# Patient Record
Sex: FEMALE | Race: WHITE
[De-identification: names, ages, dates, MRNs, and addresses within clinical notes are randomized per-mention and may not be internally consistent; named-entity substitution may affect disease eponyms.]

---

## 2017-04-24 ENCOUNTER — HOSPITAL ENCOUNTER (OUTPATIENT)
Dept: HOSPITAL 8 - CFH | Age: 67
Discharge: HOME | End: 2017-04-24
Attending: NURSE PRACTITIONER
Payer: MEDICARE

## 2017-04-24 DIAGNOSIS — M79.602: Primary | ICD-10-CM

## 2020-07-03 ENCOUNTER — HOSPITAL ENCOUNTER (EMERGENCY)
Dept: HOSPITAL 8 - ED | Age: 70
Discharge: HOME | End: 2020-07-03
Payer: MEDICARE

## 2020-07-03 VITALS — WEIGHT: 150.8 LBS | HEIGHT: 64 IN | BODY MASS INDEX: 25.74 KG/M2

## 2020-07-03 VITALS — SYSTOLIC BLOOD PRESSURE: 117 MMHG | DIASTOLIC BLOOD PRESSURE: 51 MMHG

## 2020-07-03 DIAGNOSIS — E78.00: ICD-10-CM

## 2020-07-03 DIAGNOSIS — Z87.891: ICD-10-CM

## 2020-07-03 DIAGNOSIS — I10: ICD-10-CM

## 2020-07-03 DIAGNOSIS — R51: ICD-10-CM

## 2020-07-03 DIAGNOSIS — E86.0: ICD-10-CM

## 2020-07-03 DIAGNOSIS — R42: Primary | ICD-10-CM

## 2020-07-03 LAB
ALBUMIN SERPL-MCNC: 4.3 G/DL (ref 3.4–5)
ALP SERPL-CCNC: 75 U/L (ref 45–117)
ALT SERPL-CCNC: 40 U/L (ref 12–78)
ANION GAP SERPL CALC-SCNC: 8 MMOL/L (ref 5–15)
BASOPHILS # BLD AUTO: 0.05 X10^3/UL (ref 0–0.1)
BASOPHILS NFR BLD AUTO: 1 % (ref 0–1)
BILIRUB SERPL-MCNC: 0.4 MG/DL (ref 0.2–1)
CALCIUM SERPL-MCNC: 9.6 MG/DL (ref 8.5–10.1)
CHLORIDE SERPL-SCNC: 108 MMOL/L (ref 98–107)
CREAT SERPL-MCNC: 0.81 MG/DL (ref 0.55–1.02)
EOSINOPHIL # BLD AUTO: 0.14 X10^3/UL (ref 0–0.4)
EOSINOPHIL NFR BLD AUTO: 2 % (ref 1–7)
ERYTHROCYTE [DISTWIDTH] IN BLOOD BY AUTOMATED COUNT: 13.5 % (ref 9.6–15.2)
LYMPHOCYTES # BLD AUTO: 2.06 X10^3/UL (ref 1–3.4)
LYMPHOCYTES NFR BLD AUTO: 27 % (ref 22–44)
MCH RBC QN AUTO: 31.2 PG (ref 27–34.8)
MCHC RBC AUTO-ENTMCNC: 33.2 G/DL (ref 32.4–35.8)
MCV RBC AUTO: 94 FL (ref 80–100)
MD: NO
MICROSCOPIC: (no result)
MONOCYTES # BLD AUTO: 0.43 X10^3/UL (ref 0.2–0.8)
MONOCYTES NFR BLD AUTO: 6 % (ref 2–9)
NEUTROPHILS # BLD AUTO: 4.83 X10^3/UL (ref 1.8–6.8)
NEUTROPHILS NFR BLD AUTO: 64 % (ref 42–75)
PLATELET # BLD AUTO: 245 X10^3/UL (ref 130–400)
PMV BLD AUTO: 8.4 FL (ref 7.4–10.4)
PROT SERPL-MCNC: 7.7 G/DL (ref 6.4–8.2)
RBC # BLD AUTO: 4.69 X10^6/UL (ref 3.82–5.3)

## 2020-07-03 PROCEDURE — 83735 ASSAY OF MAGNESIUM: CPT

## 2020-07-03 PROCEDURE — 82607 VITAMIN B-12: CPT

## 2020-07-03 PROCEDURE — 81003 URINALYSIS AUTO W/O SCOPE: CPT

## 2020-07-03 PROCEDURE — 80053 COMPREHEN METABOLIC PANEL: CPT

## 2020-07-03 PROCEDURE — 85025 COMPLETE CBC W/AUTO DIFF WBC: CPT

## 2020-07-03 PROCEDURE — 96360 HYDRATION IV INFUSION INIT: CPT

## 2020-07-03 PROCEDURE — 93005 ELECTROCARDIOGRAM TRACING: CPT

## 2020-07-03 PROCEDURE — 70450 CT HEAD/BRAIN W/O DYE: CPT

## 2020-07-03 PROCEDURE — 36415 COLL VENOUS BLD VENIPUNCTURE: CPT

## 2020-07-03 PROCEDURE — 99285 EMERGENCY DEPT VISIT HI MDM: CPT

## 2020-07-03 NOTE — NUR
Pt c/o vertigo sx x2 days as well as LLE weakness and numbness. Pt ambulatory 
with steady gait, denies pain, no other neuro defecits. Pt placed in gown and 
positioned for comfort in bed with warm blanket. Coninutous heart, oxygen and 
BP monitors appleid, all safety measures observed.

## 2020-07-03 NOTE — NUR
Pt resting in bed looking at her phone, FILOMENA. Pt denies dizziness, pain or 
other needs at this time.

## 2021-01-15 DIAGNOSIS — Z23 NEED FOR VACCINATION: ICD-10-CM

## 2021-01-26 ENCOUNTER — APPOINTMENT (OUTPATIENT)
Dept: FAMILY PLANNING/WOMEN'S HEALTH CLINIC | Facility: IMMUNIZATION CENTER | Age: 71
End: 2021-01-26
Attending: INTERNAL MEDICINE
Payer: MEDICARE

## 2021-01-26 ENCOUNTER — IMMUNIZATION (OUTPATIENT)
Dept: FAMILY PLANNING/WOMEN'S HEALTH CLINIC | Facility: IMMUNIZATION CENTER | Age: 71
End: 2021-01-26
Payer: MEDICARE

## 2021-01-26 DIAGNOSIS — Z23 NEED FOR VACCINATION: ICD-10-CM

## 2021-01-26 DIAGNOSIS — Z23 ENCOUNTER FOR VACCINATION: Primary | ICD-10-CM

## 2021-01-26 PROCEDURE — 0001A PFIZER SARS-COV-2 VACCINE: CPT | Performed by: PHYSICIAN ASSISTANT

## 2021-01-26 PROCEDURE — 91300 PFIZER SARS-COV-2 VACCINE: CPT | Performed by: PHYSICIAN ASSISTANT

## 2021-02-18 ENCOUNTER — IMMUNIZATION (OUTPATIENT)
Dept: FAMILY PLANNING/WOMEN'S HEALTH CLINIC | Facility: IMMUNIZATION CENTER | Age: 71
End: 2021-02-18
Attending: INTERNAL MEDICINE
Payer: MEDICARE

## 2021-02-18 DIAGNOSIS — Z23 ENCOUNTER FOR VACCINATION: Primary | ICD-10-CM

## 2021-02-18 PROCEDURE — 91300 PFIZER SARS-COV-2 VACCINE: CPT

## 2021-02-18 PROCEDURE — 0002A PFIZER SARS-COV-2 VACCINE: CPT

## 2023-01-01 ENCOUNTER — PATIENT MESSAGE (OUTPATIENT)
Dept: HEALTH INFORMATION MANAGEMENT | Facility: OTHER | Age: 73
End: 2023-01-01

## 2023-01-01 ENCOUNTER — HOSPITAL ENCOUNTER (OUTPATIENT)
Dept: CARDIOLOGY | Facility: MEDICAL CENTER | Age: 73
End: 2023-07-27
Attending: INTERNAL MEDICINE
Payer: MEDICARE

## 2023-01-01 ENCOUNTER — HOSPITAL ENCOUNTER (OUTPATIENT)
Dept: LAB | Facility: MEDICAL CENTER | Age: 73
End: 2023-06-26
Attending: FAMILY MEDICINE
Payer: MEDICARE

## 2023-01-01 ENCOUNTER — OFFICE VISIT (OUTPATIENT)
Dept: CARDIOLOGY | Facility: MEDICAL CENTER | Age: 73
End: 2023-01-01
Attending: INTERNAL MEDICINE
Payer: MEDICARE

## 2023-01-01 ENCOUNTER — HOSPITAL ENCOUNTER (OUTPATIENT)
Dept: RADIOLOGY | Facility: MEDICAL CENTER | Age: 73
End: 2023-08-31
Attending: INTERNAL MEDICINE
Payer: MEDICARE

## 2023-01-01 ENCOUNTER — HOSPITAL ENCOUNTER (OUTPATIENT)
Dept: RADIOLOGY | Facility: MEDICAL CENTER | Age: 73
End: 2023-12-13
Attending: EMERGENCY MEDICINE
Payer: MEDICARE

## 2023-01-01 ENCOUNTER — TELEPHONE (OUTPATIENT)
Dept: CARDIOLOGY | Facility: MEDICAL CENTER | Age: 73
End: 2023-01-01
Payer: MEDICARE

## 2023-01-01 ENCOUNTER — APPOINTMENT (OUTPATIENT)
Dept: RADIOLOGY | Facility: MEDICAL CENTER | Age: 73
End: 2023-01-01
Attending: INTERNAL MEDICINE
Payer: MEDICARE

## 2023-01-01 ENCOUNTER — HOSPITAL ENCOUNTER (OUTPATIENT)
Dept: RADIOLOGY | Facility: MEDICAL CENTER | Age: 73
End: 2023-09-07
Attending: INTERNAL MEDICINE
Payer: MEDICARE

## 2023-01-01 ENCOUNTER — OFFICE VISIT (OUTPATIENT)
Dept: URGENT CARE | Facility: CLINIC | Age: 73
End: 2023-01-01
Payer: MEDICARE

## 2023-01-01 ENCOUNTER — HOSPITAL ENCOUNTER (OUTPATIENT)
Dept: RADIOLOGY | Facility: MEDICAL CENTER | Age: 73
End: 2023-12-13
Payer: MEDICARE

## 2023-01-01 VITALS
RESPIRATION RATE: 16 BRPM | HEIGHT: 64 IN | SYSTOLIC BLOOD PRESSURE: 116 MMHG | DIASTOLIC BLOOD PRESSURE: 62 MMHG | BODY MASS INDEX: 31.07 KG/M2 | WEIGHT: 182 LBS | OXYGEN SATURATION: 95 % | HEART RATE: 66 BPM

## 2023-01-01 VITALS
HEART RATE: 76 BPM | OXYGEN SATURATION: 96 % | RESPIRATION RATE: 20 BRPM | TEMPERATURE: 98.2 F | SYSTOLIC BLOOD PRESSURE: 114 MMHG | BODY MASS INDEX: 25.27 KG/M2 | WEIGHT: 148 LBS | DIASTOLIC BLOOD PRESSURE: 70 MMHG | HEIGHT: 64 IN

## 2023-01-01 DIAGNOSIS — R42 DIZZINESS: ICD-10-CM

## 2023-01-01 DIAGNOSIS — R94.31 ABNORMAL ECG: ICD-10-CM

## 2023-01-01 DIAGNOSIS — R06.09 OTHER FORM OF DYSPNEA: ICD-10-CM

## 2023-01-01 DIAGNOSIS — E78.5 DYSLIPIDEMIA: ICD-10-CM

## 2023-01-01 DIAGNOSIS — I20.89 ANGINA OF EFFORT (HCC): ICD-10-CM

## 2023-01-01 DIAGNOSIS — U07.1 COVID-19 VIRUS INFECTION: ICD-10-CM

## 2023-01-01 LAB
ALBUMIN SERPL BCP-MCNC: 4.5 G/DL (ref 3.2–4.9)
ALBUMIN/GLOB SERPL: 2.1 G/DL
ALP SERPL-CCNC: 77 U/L (ref 30–99)
ALT SERPL-CCNC: 12 U/L (ref 2–50)
ANION GAP SERPL CALC-SCNC: 11 MMOL/L (ref 7–16)
AST SERPL-CCNC: 12 U/L (ref 12–45)
BASOPHILS # BLD AUTO: 0.8 % (ref 0–1.8)
BASOPHILS # BLD: 0.05 K/UL (ref 0–0.12)
BILIRUB SERPL-MCNC: 0.4 MG/DL (ref 0.1–1.5)
BUN SERPL-MCNC: 13 MG/DL (ref 8–22)
CALCIUM ALBUM COR SERPL-MCNC: 9.5 MG/DL (ref 8.5–10.5)
CALCIUM SERPL-MCNC: 9.9 MG/DL (ref 8.5–10.5)
CHLORIDE SERPL-SCNC: 106 MMOL/L (ref 96–112)
CHOLEST SERPL-MCNC: 252 MG/DL (ref 100–199)
CO2 SERPL-SCNC: 27 MMOL/L (ref 20–33)
CREAT SERPL-MCNC: 0.61 MG/DL (ref 0.5–1.4)
CRP SERPL HS-MCNC: 0.9 MG/L (ref 0–3)
EKG IMPRESSION: NORMAL
EOSINOPHIL # BLD AUTO: 0.2 K/UL (ref 0–0.51)
EOSINOPHIL NFR BLD: 3.1 % (ref 0–6.9)
ERYTHROCYTE [DISTWIDTH] IN BLOOD BY AUTOMATED COUNT: 47.9 FL (ref 35.9–50)
FASTING STATUS PATIENT QL REPORTED: NORMAL
GFR SERPLBLD CREATININE-BSD FMLA CKD-EPI: 94 ML/MIN/1.73 M 2
GLOBULIN SER CALC-MCNC: 2.1 G/DL (ref 1.9–3.5)
GLUCOSE SERPL-MCNC: 107 MG/DL (ref 65–99)
HCT VFR BLD AUTO: 43.7 % (ref 37–47)
HDLC SERPL-MCNC: 80 MG/DL
HGB BLD-MCNC: 16 G/DL (ref 12–16)
IMM GRANULOCYTES # BLD AUTO: 0.02 K/UL (ref 0–0.11)
IMM GRANULOCYTES NFR BLD AUTO: 0.3 % (ref 0–0.9)
LDLC SERPL CALC-MCNC: 139 MG/DL
LV EJECT FRACT  99904: 60
LV EJECT FRACT MOD 2C 99903: 71.18
LV EJECT FRACT MOD 4C 99902: 62.6
LV EJECT FRACT MOD BP 99901: 68.22
LYMPHOCYTES # BLD AUTO: 1.91 K/UL (ref 1–4.8)
LYMPHOCYTES NFR BLD: 29.8 % (ref 22–41)
MCH RBC QN AUTO: 34.9 PG (ref 27–33)
MCHC RBC AUTO-ENTMCNC: 36.6 G/DL (ref 32.2–35.5)
MCV RBC AUTO: 95.2 FL (ref 81.4–97.8)
MONOCYTES # BLD AUTO: 0.59 K/UL (ref 0–0.85)
MONOCYTES NFR BLD AUTO: 9.2 % (ref 0–13.4)
NEUTROPHILS # BLD AUTO: 3.64 K/UL (ref 1.82–7.42)
NEUTROPHILS NFR BLD: 56.8 % (ref 44–72)
NRBC # BLD AUTO: 0 K/UL
NRBC BLD-RTO: 0 /100 WBC (ref 0–0.2)
PLATELET # BLD AUTO: 264 K/UL (ref 164–446)
PMV BLD AUTO: 10.2 FL (ref 9–12.9)
POTASSIUM SERPL-SCNC: 4.6 MMOL/L (ref 3.6–5.5)
PROT SERPL-MCNC: 6.6 G/DL (ref 6–8.2)
RBC # BLD AUTO: 4.59 M/UL (ref 4.2–5.4)
SODIUM SERPL-SCNC: 144 MMOL/L (ref 135–145)
TRIGL SERPL-MCNC: 167 MG/DL (ref 0–149)
TSH SERPL DL<=0.005 MIU/L-ACNC: 2.15 UIU/ML (ref 0.38–5.33)
WBC # BLD AUTO: 6.4 K/UL (ref 4.8–10.8)

## 2023-01-01 PROCEDURE — 700111 HCHG RX REV CODE 636 W/ 250 OVERRIDE (IP)

## 2023-01-01 PROCEDURE — 93306 TTE W/DOPPLER COMPLETE: CPT | Mod: 26 | Performed by: INTERNAL MEDICINE

## 2023-01-01 PROCEDURE — 93306 TTE W/DOPPLER COMPLETE: CPT

## 2023-01-01 PROCEDURE — 86141 C-REACTIVE PROTEIN HS: CPT

## 2023-01-01 PROCEDURE — 3078F DIAST BP <80 MM HG: CPT | Performed by: PHYSICIAN ASSISTANT

## 2023-01-01 PROCEDURE — 80061 LIPID PANEL: CPT

## 2023-01-01 PROCEDURE — 36415 COLL VENOUS BLD VENIPUNCTURE: CPT

## 2023-01-01 PROCEDURE — 3074F SYST BP LT 130 MM HG: CPT | Performed by: INTERNAL MEDICINE

## 2023-01-01 PROCEDURE — 78452 HT MUSCLE IMAGE SPECT MULT: CPT

## 2023-01-01 PROCEDURE — 80053 COMPREHEN METABOLIC PANEL: CPT

## 2023-01-01 PROCEDURE — 85025 COMPLETE CBC W/AUTO DIFF WBC: CPT

## 2023-01-01 PROCEDURE — 99204 OFFICE O/P NEW MOD 45 MIN: CPT | Performed by: INTERNAL MEDICINE

## 2023-01-01 PROCEDURE — 99213 OFFICE O/P EST LOW 20 MIN: CPT | Performed by: INTERNAL MEDICINE

## 2023-01-01 PROCEDURE — 93010 ELECTROCARDIOGRAM REPORT: CPT | Performed by: INTERNAL MEDICINE

## 2023-01-01 PROCEDURE — 99212 OFFICE O/P EST SF 10 MIN: CPT | Performed by: INTERNAL MEDICINE

## 2023-01-01 PROCEDURE — 3074F SYST BP LT 130 MM HG: CPT | Performed by: PHYSICIAN ASSISTANT

## 2023-01-01 PROCEDURE — 84443 ASSAY THYROID STIM HORMONE: CPT

## 2023-01-01 PROCEDURE — 3078F DIAST BP <80 MM HG: CPT | Performed by: INTERNAL MEDICINE

## 2023-01-01 PROCEDURE — 99203 OFFICE O/P NEW LOW 30 MIN: CPT | Performed by: PHYSICIAN ASSISTANT

## 2023-01-01 PROCEDURE — 93005 ELECTROCARDIOGRAM TRACING: CPT | Performed by: INTERNAL MEDICINE

## 2023-01-01 RX ORDER — AMINOPHYLLINE 25 MG/ML
100 INJECTION, SOLUTION INTRAVENOUS
Status: DISCONTINUED | OUTPATIENT
Start: 2023-01-01 | End: 2023-01-01 | Stop reason: HOSPADM

## 2023-01-01 RX ORDER — EZETIMIBE 10 MG/1
10 TABLET ORAL DAILY
Qty: 90 TABLET | Refills: 3 | Status: SHIPPED | OUTPATIENT
Start: 2023-01-01 | End: 2024-02-09

## 2023-01-01 RX ORDER — REGADENOSON 0.08 MG/ML
0.4 INJECTION, SOLUTION INTRAVENOUS ONCE
Status: COMPLETED | OUTPATIENT
Start: 2023-01-01 | End: 2023-01-01

## 2023-01-01 RX ORDER — REGADENOSON 0.08 MG/ML
INJECTION, SOLUTION INTRAVENOUS
Status: COMPLETED
Start: 2023-01-01 | End: 2023-01-01

## 2023-01-01 RX ADMIN — REGADENOSON 0.4 MG: 0.08 INJECTION, SOLUTION INTRAVENOUS at 10:21

## 2023-01-01 ASSESSMENT — ENCOUNTER SYMPTOMS
HEADACHES: 1
SHORTNESS OF BREATH: 1
NAUSEA: 0
DYSPNEA ON EXERTION: 1
MYALGIAS: 1
DIZZINESS: 1
DIAPHORESIS: 0
FEVER: 0
CHILLS: 1
WHEEZING: 0
CONSTIPATION: 0
HEADACHES: 0
WHEEZING: 0
NUMBNESS: 0
DIARRHEA: 0
DIARRHEA: 0
FALLS: 0
ABDOMINAL PAIN: 0
DECREASED APPETITE: 0
SYNCOPE: 0
PND: 0
BLOATING: 0
PALPITATIONS: 1
NAUSEA: 0
SORE THROAT: 0
LIGHT-HEADEDNESS: 0
MYALGIAS: 0
COUGH: 1
ORTHOPNEA: 0
NEAR-SYNCOPE: 0
FEVER: 1
PARESTHESIAS: 0
BACK PAIN: 0
SLEEP DISTURBANCES DUE TO BREATHING: 0
IRREGULAR HEARTBEAT: 0
SPUTUM PRODUCTION: 1
COUGH: 0
LOSS OF BALANCE: 0
SORE THROAT: 0
BLURRED VISION: 0
SHORTNESS OF BREATH: 0
FLANK PAIN: 0
DOUBLE VISION: 0
VOMITING: 0
WEAKNESS: 1
VOMITING: 1
EXCESSIVE DAYTIME SLEEPINESS: 0
NIGHT SWEATS: 0

## 2023-01-01 ASSESSMENT — FIBROSIS 4 INDEX
FIB4 SCORE: 0.97
FIB4 SCORE: 0.97

## 2023-07-13 NOTE — PROGRESS NOTES
Cardiology Initial Consultation Note    Date of note:    7/13/2023    Primary Care Provider: Daly Forbes M.D.  Referring Provider: Daly Forbes M.D.    Patient Name: Davis Roque     YOB: 1950  MRN:              [id number]    Chief Complaint   Patient presents with    Shortness of Breath    Hyperlipidemia    Dizziness       History of Present Illness: Ms. Davis Roque is a 73 y.o. female whose current medical problems include dyslipidemia who is here for cardiac consultation for fatigue and shortness of breath.    Patient states that for the past few months she has been feeling fatigued, dizzy and lightheaded.  Had vertigo several years ago which was different than what she feels now.  Had COVID in 6/2022 and since then she has not felt herself.   Also gets chest heaviness and fluttering sensation along with dyspnea on exertion.    In terms of physical activity, performs her ADLs but has not been able to exercise due to not feeling well.    Cardiovascular Risk Factors:  1. Smoking status: Never smoker  2. Type II Diabetes Mellitus: No  3. Hypertension: No  4. Dyslipidemia: Yes  Cholesterol,Tot   Date Value Ref Range Status   06/26/2023 252 (H) 100 - 199 mg/dL Final     LDL   Date Value Ref Range Status   06/26/2023 139 (H) <100 mg/dL Final     HDL   Date Value Ref Range Status   06/26/2023 80 >=40 mg/dL Final     Triglycerides   Date Value Ref Range Status   06/26/2023 167 (H) 0 - 149 mg/dL Final     5. Family history of early Coronary Artery Disease in a first degree relative (Male less than 55 years of age; Female less than 65 years of age): Denies; mother had stroke      Review of Systems   Constitutional: Positive for malaise/fatigue. Negative for decreased appetite, diaphoresis, fever and night sweats.   HENT:  Negative for congestion and sore throat.    Eyes:  Negative for blurred vision and double vision.   Cardiovascular:  Positive for chest pain, dyspnea on exertion and  palpitations. Negative for cyanosis, irregular heartbeat, leg swelling, near-syncope, orthopnea, paroxysmal nocturnal dyspnea and syncope.   Respiratory:  Positive for shortness of breath. Negative for cough, sleep disturbances due to breathing and wheezing.    Endocrine: Negative for cold intolerance and heat intolerance.   Musculoskeletal:  Negative for back pain, falls and myalgias.   Gastrointestinal:  Negative for bloating, constipation, diarrhea, nausea and vomiting.   Genitourinary:  Negative for dysuria and flank pain.   Neurological:  Positive for dizziness and weakness. Negative for excessive daytime sleepiness, headaches, light-headedness, loss of balance, numbness and paresthesias.         Past Medical History:   Diagnosis Date    Glaucoma     Hyperlipemia          History reviewed. No pertinent surgical history.      Current Outpatient Medications   Medication Sig Dispense Refill    Multiple Vitamins-Minerals (PRESERVISION AREDS PO) Take  by mouth.      ezetimibe (ZETIA) 10 MG Tab Take 1 Tablet by mouth every day. 90 Tablet 3    coenzyme Q-10 30 MG capsule Take 60 mg by mouth every day.        Multiple Vitamins-Minerals (MULTI VITAMIN/MINERALS PO) Take  by mouth.        Calcium Carbonate-Vitamin D (CALCIUM + D) 600-200 MG-UNIT TABS Take  by mouth.        aspirin (ASA) 325 MG TABS Take 1 Tab by mouth every bedtime. (Patient taking differently: Take 325 mg by mouth at bedtime. 4x a day) 30 Each 5    atorvastatin (LIPITOR) 80 MG tablet Take 40 mg by mouth every evening.         No current facility-administered medications for this visit.         No Active Allergies      History reviewed. No pertinent family history.      Social History     Socioeconomic History    Marital status:      Spouse name: Not on file    Number of children: Not on file    Years of education: Not on file    Highest education level: Not on file   Occupational History    Not on file   Tobacco Use    Smoking status: Former      "Packs/day: 1.00     Years: 13.00     Pack years: 13.00     Types: Cigarettes     Quit date: 1979     Years since quittin.5    Smokeless tobacco: Never   Substance and Sexual Activity    Alcohol use: Yes     Comment: Daily glass of wine a night    Drug use: Never    Sexual activity: Not on file   Other Topics Concern    Not on file   Social History Narrative    Not on file     Social Determinants of Health     Financial Resource Strain: Not on file   Food Insecurity: Not on file   Transportation Needs: Not on file   Physical Activity: Not on file   Stress: Not on file   Social Connections: Not on file   Intimate Partner Violence: Not on file   Housing Stability: Not on file         Physical Exam:  Ambulatory Vitals  /62 (BP Location: Left arm, Patient Position: Sitting, BP Cuff Size: Adult)   Pulse 66   Resp 16   Ht 1.626 m (5' 4\")   Wt 82.6 kg (182 lb)   SpO2 95%    Oxygen Therapy:  Pulse Oximetry: 95 %  BP Readings from Last 4 Encounters:   23 116/62   10/30/12 108/70   10/22/12 108/64       Weight/BMI: Body mass index is 31.24 kg/m².  Wt Readings from Last 4 Encounters:   23 82.6 kg (182 lb)   10/30/12 69.9 kg (154 lb)   10/22/12 69.9 kg (154 lb)         General: Well appearing and in no apparent distress  Eyes: nl conjunctiva, no icteric sclera  ENT:  normal external appearance of ears  Neck: no visible JVP,  no carotid bruits  Lungs: normal respiratory effort, CTAB  Heart: RRR, no murmurs, no rubs or gallops,  no edema bilateral lower extremities. No LV/RV heave on cardiac palpatation. 2+ bilateral radial pulses.    Abdomen: soft, non tender, non distended, no masses, normal bowel sounds.  No HSM.  Extremities/MSK: no clubbing, no cyanosis  Neurological: No focal sensory deficits  Psychiatric: Appropriate affect, A/O x 3, intact judgement and insight  Skin: Warm extremities      Lab Data Review:  Lab Results   Component Value Date/Time    CHOLSTRLTOT 252 (H) 2023 09:02 AM    "  (H) 06/26/2023 09:02 AM    HDL 80 06/26/2023 09:02 AM    TRIGLYCERIDE 167 (H) 06/26/2023 09:02 AM       Lab Results   Component Value Date/Time    SODIUM 144 06/26/2023 09:02 AM    POTASSIUM 4.6 06/26/2023 09:02 AM    CHLORIDE 106 06/26/2023 09:02 AM    CO2 27 06/26/2023 09:02 AM    GLUCOSE 107 (H) 06/26/2023 09:02 AM    BUN 13 06/26/2023 09:02 AM    CREATININE 0.61 06/26/2023 09:02 AM     Lab Results   Component Value Date/Time    ALKPHOSPHAT 77 06/26/2023 09:02 AM    ASTSGOT 12 06/26/2023 09:02 AM    ALTSGPT 12 06/26/2023 09:02 AM    TBILIRUBIN 0.4 06/26/2023 09:02 AM      Lab Results   Component Value Date/Time    WBC 6.4 06/26/2023 09:02 AM     No results found for: HBA1C      Cardiac Imaging and Procedures Review:    EKG dated 7/13/2023: My personal interpretation is sinus rhythm with low voltage throughout    Echo dated 10/30/2012:   Normal echocardiogram.   No significant valve abnormalities.     Exercise stress testing (10/30/2012):   Abnormal exercise stress test.   1.5mm ST depression in anterior leads during stages 2 and 3.   The results of the regular exercise stress test for this patient   reveal a intermediate likelihood of clinically significant CAD.      Radiology test Review:  Outside CT head (7/3/2020):   Negative noncontrast cranial CT.      Assessment & Plan     1. Dizziness  EKG    [id number] PET W/FLOW RESERVE    EC-ECHOCARDIOGRAM COMPLETE W/O CONT      2. Other form of dyspnea  EKG    [id number] PET W/FLOW RESERVE    EC-ECHOCARDIOGRAM COMPLETE W/O CONT      3. Dyslipidemia  ezetimibe (ZETIA) 10 MG Tab      4. Angina of effort (HCC)  [id number] PET W/FLOW RESERVE      5. Abnormal ECG  [id number] PET W/FLOW RESERVE    EC-ECHOCARDIOGRAM COMPLETE W/O CONT            Shared Medical Decision Making:  Patient with ongoing multitude of symptoms with dizziness, dyspnea, shortness of breath, chest heaviness and generalized weakness/fatigue is here for cardiac evaluation.   EKG shows low voltage throughout which is concerning for cardiac amyloidosis with ongoing symptoms.  Obtain transthoracic echocardiogram with strain to rule out amyloidosis.  We will also evaluate underlying cardiac structure and function.    Obtain nuclear cardiac PET scan with FFR to rule out obstructive CAD or microvascular angina which could be contributing to her ongoing symptoms.    For dyslipidemia, she is on Lipitor 40 mg daily.  Discussed LDL results which is above goal.  After discussion, start Zetia 10 mg daily.    I have independently interpreted test results and discussed results and management with the patient.    All of patient's excellent questions were answered to the best of my knowledge and to her satisfaction.  It was a pleasure seeing Ms. Davis Roque in my clinic today. RTC if abnormal test results otherwise as needed. Patient is aware to call the cardiology clinic with any questions or concerns.      Rodríguez Moody MD  [facility]  [facility], [address]  [address] [address]  Phone: [phone]  Fax: [phone]    Please note that this dictation was created using voice recognition software. I have made every reasonable attempt to correct obvious errors, but it is possible there are errors of grammar and possibly content that I did not discover before finalizing the note.

## 2023-08-22 NOTE — TELEPHONE ENCOUNTER
Phone Number Called: [phone]    Call outcome: Spoke to patient regarding message below.    Message: Called to discuss NM Cardiac Stress Test. Pt in agreement. Pt provided phone number for scheduling diagnostic. Pt appreciative of phone call

## 2023-08-22 NOTE — TELEPHONE ENCOUNTER
----- Message from Rodríguez Moody M.D. sent at 8/22/2023  1:32 PM PDT -----  Regarding: FW: CARDIAC PET-CT  Can you please order NM cardiac stress test instead and let the patient know to call and schedule?  Thanks.  ----- Message -----  From: Izzy Pisano  Sent: 8/21/2023   9:30 AM PDT  To: Rodríguez Moody M.D.  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 Please place new orders if necessary. Echo completed 7/27/23. Thank you!

## 2023-08-22 NOTE — TELEPHONE ENCOUNTER
Phone Number Called: [phone]    Call outcome: Did not leave a detailed message. Requested patient to call back.    Message: Called to discuss changing PET to NM Cardiac stress test     PET discontinued, NM stress test order. Awaiting phone call return

## 2023-08-22 NOTE — TELEPHONE ENCOUNTER
Caller: Davis Roque    Topic/issue: RETURNING RN CALL    Callback Number: [phone] (home)

## 2023-09-20 NOTE — PROGRESS NOTES
Subjective     Stefania Roque is a 73 y.o. female who presents with Cough (X3days runny nose/home COVID test positive/)        Davis Roque is a 73 y.o. female who presents for COVID-19 virus.  Patient and her  are here for same symptoms.  She notes that she may have had some mild symptoms a few days ago but definitely started with symptoms yesterday.  She has had runny nose, congestion, cough with mild production.  Yesterday evening she was also noting general malaise, chills, subjective fever.  She says that she has tried taking OTC NyQuil and DayQuil but has had an episode of vomiting after taking the medication each time.  She has not had any shortness of breath or chest pain.  She did 2 at home tests for COVID-19 virus and they were both positive.        Review of Systems   Constitutional:  Positive for chills, fever (subjective last night) and malaise/fatigue.   HENT:  Positive for congestion. Negative for sore throat.    Respiratory:  Positive for cough and sputum production. Negative for shortness of breath and wheezing.    Cardiovascular:  Negative for chest pain.   Gastrointestinal:  Positive for vomiting. Negative for abdominal pain, diarrhea and nausea.   Musculoskeletal:  Positive for myalgias.   Neurological:  Positive for headaches.       PMH:  has a past medical history of Glaucoma and Hyperlipemia.  MEDS:   Current Outpatient Medications:     Multiple Vitamins-Minerals (PRESERVISION AREDS PO), Take  by mouth., Disp: , Rfl:     ezetimibe (ZETIA) 10 MG Tab, Take 1 Tablet by mouth every day., Disp: 90 Tablet, Rfl: 3    coenzyme Q-10 30 MG capsule, Take 60 mg by mouth every day.  , Disp: , Rfl:     Multiple Vitamins-Minerals (MULTI VITAMIN/MINERALS PO), Take  by mouth.  , Disp: , Rfl:     Calcium Carbonate-Vitamin D (CALCIUM + D) 600-200 MG-UNIT TABS, Take  by mouth.  , Disp: , Rfl:     aspirin (ASA) 325 MG TABS, Take 1 Tab by mouth every bedtime. (Patient taking differently: Take 325 mg by  "mouth at bedtime. 4x a day), Disp: 30 Each, Rfl: 5    atorvastatin (LIPITOR) 80 MG tablet, Take 40 mg by mouth every evening.  , Disp: , Rfl:   ALLERGIES:   Allergies   Allergen Reactions    Terramycin [Oxytetracycline] Hives     Nausea/hives     SURGHX: No past surgical history on file.  SOCHX:  reports that she quit smoking about 44 years ago. Her smoking use included cigarettes. She started smoking about 57 years ago. She has a 13.0 pack-year smoking history. She has never used smokeless tobacco. She reports current alcohol use. She reports that she does not use drugs.  FH: Family history was reviewed, no pertinent findings to report      Objective     /70 (BP Location: Left arm, Patient Position: Sitting)   Pulse 76   Temp 36.8 °C (98.2 °F) (Temporal)   Resp 20   Ht 1.626 m (5' 4\")   Wt 67.1 kg (148 lb)   SpO2 96%   BMI 25.40 kg/m²      Physical Exam  Constitutional:       Appearance: She is well-developed.   HENT:      Head: Normocephalic and atraumatic.      Right Ear: Tympanic membrane, ear canal and external ear normal.      Left Ear: Tympanic membrane, ear canal and external ear normal.      Nose: Mucosal edema and congestion present. No rhinorrhea.      Mouth/Throat:      Lips: Pink.      Mouth: Mucous membranes are moist.      Pharynx: Oropharynx is clear.   Eyes:      Conjunctiva/sclera: Conjunctivae normal.      Pupils: Pupils are equal, round, and reactive to light.   Cardiovascular:      Rate and Rhythm: Normal rate and regular rhythm.      Heart sounds: Normal heart sounds. No murmur heard.  Pulmonary:      Effort: Pulmonary effort is normal.      Breath sounds: Normal breath sounds. No decreased breath sounds, wheezing, rhonchi or rales.   Musculoskeletal:      Cervical back: Normal range of motion.   Lymphadenopathy:      Cervical: No cervical adenopathy.   Skin:     General: Skin is warm and dry.      Capillary Refill: Capillary refill takes less than 2 seconds.   Neurological:      " Mental Status: She is alert and oriented to person, place, and time.   Psychiatric:         Behavior: Behavior normal.         Judgment: Judgment normal.           Assessment & Plan     1. COVID-19 virus infection  - Nirmatrelvir&Ritonavir 300/100 20 x 150 MG & 10 x 100MG Tablet Therapy Pack; Take 300 mg nirmatrelvir (two 150 mg tablets) with 100 mg ritonavir (one 100 mg tablet) by mouth, with all three tablets taken together twice daily for 5 days.  Dispense: 30 Each; Refill: 0  *GFR from 6/26/2023 was 94.  Patient is taking atorvastatin.  Discussed that she will need to stop taking this medication.  Last time she took it was Monday night.  She should not start it again until she has been finished with the Paxlovid for a full 5 days.  - OTC cold/flu medications  -Supportive care also discussed to include the use of saline nasal rinses, steam inhalation, and the use of a cool-mist humidifier in the bedroom at night.  - PO fluids  - Rest  - Tylenol or ibuprofen as needed for fever > 100.4 F  -Self-isolation instructions discussed            Differential Diagnosis, natural history, and supportive care discussed. Return to the Urgent Care or follow up with your PCP if symptoms fail to resolve, or for any new or worsening symptoms. Emergency room precautions discussed. Patient and/or family appears understanding of information.

## 2024-01-01 ENCOUNTER — HOSPITAL ENCOUNTER (OUTPATIENT)
Dept: RADIOLOGY | Facility: MEDICAL CENTER | Age: 74
End: 2024-01-03
Payer: MEDICARE

## 2024-01-01 ENCOUNTER — HOSPITAL ENCOUNTER (OUTPATIENT)
Dept: RADIATION ONCOLOGY | Facility: MEDICAL CENTER | Age: 74
End: 2024-01-31
Attending: RADIOLOGY
Payer: MEDICARE

## 2024-01-01 ENCOUNTER — HOSPITAL ENCOUNTER (OUTPATIENT)
Dept: HEMATOLOGY ONCOLOGY | Facility: MEDICAL CENTER | Age: 74
End: 2024-01-11
Attending: STUDENT IN AN ORGANIZED HEALTH CARE EDUCATION/TRAINING PROGRAM
Payer: MEDICARE

## 2024-01-01 ENCOUNTER — HOSPITAL ENCOUNTER (OUTPATIENT)
Dept: RADIOLOGY | Facility: MEDICAL CENTER | Age: 74
End: 2024-01-03
Attending: NEUROLOGICAL SURGERY
Payer: MEDICARE

## 2024-01-01 ENCOUNTER — HOSPITAL ENCOUNTER (OUTPATIENT)
Dept: RADIOLOGY | Facility: MEDICAL CENTER | Age: 74
End: 2024-01-03
Attending: EMERGENCY MEDICINE
Payer: MEDICARE

## 2024-01-01 ENCOUNTER — PATIENT OUTREACH (OUTPATIENT)
Dept: ONCOLOGY | Facility: MEDICAL CENTER | Age: 74
End: 2024-01-01
Payer: MEDICARE

## 2024-01-01 ENCOUNTER — HOSPICE ADMISSION (OUTPATIENT)
Dept: HOSPICE | Facility: HOSPICE | Age: 74
End: 2024-01-01
Payer: MEDICARE

## 2024-01-01 ENCOUNTER — HOME CARE VISIT (OUTPATIENT)
Dept: HOSPICE | Facility: HOSPICE | Age: 74
End: 2024-01-01
Payer: MEDICARE

## 2024-01-01 ENCOUNTER — HOSPITAL ENCOUNTER (OUTPATIENT)
Dept: RADIATION ONCOLOGY | Facility: MEDICAL CENTER | Age: 74
End: 2024-01-11

## 2024-01-01 VITALS
WEIGHT: 130.73 LBS | HEIGHT: 64 IN | RESPIRATION RATE: 16 BRPM | SYSTOLIC BLOOD PRESSURE: 117 MMHG | BODY MASS INDEX: 22.32 KG/M2 | OXYGEN SATURATION: 98 % | DIASTOLIC BLOOD PRESSURE: 73 MMHG | TEMPERATURE: 97.2 F | HEART RATE: 70 BPM

## 2024-01-01 VITALS
OXYGEN SATURATION: 98 % | HEART RATE: 66 BPM | HEIGHT: 64 IN | WEIGHT: 131.72 LBS | BODY MASS INDEX: 22.49 KG/M2 | TEMPERATURE: 98.6 F | DIASTOLIC BLOOD PRESSURE: 64 MMHG | SYSTOLIC BLOOD PRESSURE: 112 MMHG

## 2024-01-01 DIAGNOSIS — R05.3 CHRONIC COUGH: ICD-10-CM

## 2024-01-01 DIAGNOSIS — C34.90 MALIGNANT NEOPLASM OF UNSPECIFIED PART OF UNSPECIFIED BRONCHUS OR LUNG (HCC): ICD-10-CM

## 2024-01-01 DIAGNOSIS — C71.9 GLIOMA (HCC): ICD-10-CM

## 2024-01-01 DIAGNOSIS — C71.9 GLIOMA OF BRAIN (HCC): ICD-10-CM

## 2024-01-01 PROCEDURE — 77301 RADIOTHERAPY DOSE PLAN IMRT: CPT | Performed by: RADIOLOGY

## 2024-01-01 PROCEDURE — 99214 OFFICE O/P EST MOD 30 MIN: CPT | Performed by: RADIOLOGY

## 2024-01-01 PROCEDURE — 99205 OFFICE O/P NEW HI 60 MIN: CPT | Performed by: STUDENT IN AN ORGANIZED HEALTH CARE EDUCATION/TRAINING PROGRAM

## 2024-01-01 PROCEDURE — 77300 RADIATION THERAPY DOSE PLAN: CPT | Mod: 26 | Performed by: RADIOLOGY

## 2024-01-01 PROCEDURE — 77334 RADIATION TREATMENT AID(S): CPT | Performed by: RADIOLOGY

## 2024-01-01 PROCEDURE — 77263 THER RADIOLOGY TX PLNG CPLX: CPT | Performed by: RADIOLOGY

## 2024-01-01 PROCEDURE — 99441 PR PHYSICIAN TELEPHONE EVALUATION 5-10 MIN: CPT | Mod: 95 | Performed by: RADIOLOGY

## 2024-01-01 PROCEDURE — 77338 DESIGN MLC DEVICE FOR IMRT: CPT | Mod: 26 | Performed by: RADIOLOGY

## 2024-01-01 PROCEDURE — 77290 THER RAD SIMULAJ FIELD CPLX: CPT | Performed by: RADIOLOGY

## 2024-01-01 PROCEDURE — 77338 DESIGN MLC DEVICE FOR IMRT: CPT | Performed by: RADIOLOGY

## 2024-01-01 PROCEDURE — 99212 OFFICE O/P EST SF 10 MIN: CPT | Performed by: STUDENT IN AN ORGANIZED HEALTH CARE EDUCATION/TRAINING PROGRAM

## 2024-01-01 PROCEDURE — 77334 RADIATION TREATMENT AID(S): CPT | Mod: 26 | Performed by: RADIOLOGY

## 2024-01-01 PROCEDURE — 77300 RADIATION THERAPY DOSE PLAN: CPT | Performed by: RADIOLOGY

## 2024-01-01 PROCEDURE — 99205 OFFICE O/P NEW HI 60 MIN: CPT | Performed by: RADIOLOGY

## 2024-01-01 PROCEDURE — 77301 RADIOTHERAPY DOSE PLAN IMRT: CPT | Mod: 26 | Performed by: RADIOLOGY

## 2024-01-01 RX ORDER — OXYCODONE HYDROCHLORIDE 10 MG/1
10 TABLET ORAL EVERY 6 HOURS PRN
Qty: 40 TABLET | Refills: 0 | Status: SHIPPED | OUTPATIENT
Start: 2024-01-01 | End: 2024-01-01

## 2024-01-01 RX ORDER — OXYCODONE HYDROCHLORIDE AND ACETAMINOPHEN 5; 325 MG/1; MG/1
1 TABLET ORAL
COMMUNITY
Start: 2024-01-01 | End: 2024-01-01

## 2024-01-01 RX ORDER — LEVETIRACETAM 500 MG/1
500 TABLET ORAL 2 TIMES DAILY
COMMUNITY
Start: 2024-01-01 | End: 2024-02-04

## 2024-01-01 RX ORDER — ALPRAZOLAM 0.25 MG/1
0.25 TABLET ORAL PRN
Qty: 30 TABLET | Refills: 0 | Status: SHIPPED | OUTPATIENT
Start: 2024-01-01 | End: 2024-02-09

## 2024-01-01 RX ORDER — DEXAMETHASONE 4 MG/1
4 TABLET ORAL 2 TIMES DAILY
Qty: 60 TABLET | Refills: 4 | Status: SHIPPED | OUTPATIENT
Start: 2024-01-01 | End: 2024-02-07

## 2024-01-01 RX ORDER — LATANOPROST 50 UG/ML
1 SOLUTION/ DROPS OPHTHALMIC DAILY
COMMUNITY

## 2024-01-01 RX ORDER — CODEINE PHOSPHATE/GUAIFENESIN 10-100MG/5
5 LIQUID (ML) ORAL 3 TIMES DAILY PRN
Qty: 120 ML | Refills: 0 | Status: SHIPPED | OUTPATIENT
Start: 2024-01-01 | End: 2024-01-01

## 2024-01-01 ASSESSMENT — FIBROSIS 4 INDEX
FIB4 SCORE: 1.33
FIB4 SCORE: 1.33

## 2024-01-01 ASSESSMENT — ENCOUNTER SYMPTOMS
TINGLING: 0
SPEECH CHANGE: 1
COUGH: 0
INSOMNIA: 1
DEPRESSION: 0
DIZZINESS: 0
MEMORY LOSS: 0
BLURRED VISION: 0
NERVOUS/ANXIOUS: 1
ORTHOPNEA: 0
SHORTNESS OF BREATH: 0
SENSORY CHANGE: 0
CHILLS: 0
SORE THROAT: 0
PALPITATIONS: 0
WEAKNESS: 1
ABDOMINAL PAIN: 0
FOCAL WEAKNESS: 1
TREMORS: 0
NECK PAIN: 0
WEIGHT LOSS: 0
WHEEZING: 0
SPUTUM PRODUCTION: 0
BRUISES/BLEEDS EASILY: 0
HEARTBURN: 0
HEADACHES: 0
FEVER: 0
NAUSEA: 0
VOMITING: 0

## 2024-01-01 ASSESSMENT — PAIN SCALES - GENERAL
PAINLEVEL: NO PAIN
PAINLEVEL: NO PAIN

## 2024-01-08 PROBLEM — C71.9 GLIOMA (HCC): Status: ACTIVE | Noted: 2024-01-01

## 2024-01-08 NOTE — CONSULTS
RADIATION ONCOLOGY CONSULT    Patient name:  Davis Roque    Primary Physician:  Daly Forbes M.D. MRN: [id number]  [facility]: [id number]   Referring physician:  Rena Robison M.D.  : 1950, 73 y.o.     DATE OF SERVICE: 2024    IDENTIFICATION: A 73 y.o. 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    She is here at the kind request of Rena Wetzel M.D.        HISTORY OF PRESENT ILLNESS:  Subjective     I am seeing Ms. Roque for a recently diagnosed glioma for consideration of adjuvant treatment.  Her history dates back to about November or December this year when she presented with a right facial droop and slurred speech over the course of the week.  This led to a visit to the urgent care where CT of the head revealed a mass with associated edema in the left frontoparietal region.  A chest abdomen pelvis was done that was negative for any masses or metastatic disease.  This was followed with a an MRI brain that revealed a left frontal gyral mass concerning for primary glioma with 2 enhancing satellite nodules and 1 in the left frontal and then another in the left frontoparietal convexity regions measuring about 11 and 18 mm respectively.  She had a bur hole biopsy done that confirmed a glioma with increased cellularity and microvascular proliferation, CDK N2 a is positive, but IDH and MGMT unfortunately were not done on the molecular panel.  These have been requested from neuro tumor board earlier this morning.    She saw Dr. Robison for postoperative follow-up and had some mild improvement in headaches, though complained of cough and difficulty swallowing as well as right lower facial drooping and drooling.  She was discussed in our neuro-oncology tumor board and now presents for consideration of adjuvant chemoradiation.        PROBLEM LIST:  Patient Active Problem List   Diagnosis    Hyperlipemia    Glaucoma    SOB (shortness of breath)     Glioma (HCC)        PAST SURGICAL HISTORY:  Past Surgical History:   Procedure Laterality Date    OTHER  12/13/2023    L frontal ayush hole needle bx    APPENDECTOMY      1st grade    TONSILLECTOMY      1st grade       CURRENT MEDICATIONS:  Current Outpatient Medications   Medication Sig Dispense Refill    levETIRAcetam (KEPPRA) 500 MG Tab Take 500 mg by mouth 2 times a day.      oxyCODONE-acetaminophen (PERCOCET) 5-325 MG Tab Take 1 Tablet by mouth.      latanoprost (XALATAN) 0.005 % Solution Administer 1 Drop into affected eye(s) every day.      Multiple Vitamins-Minerals (PRESERVISION AREDS PO) Take  by mouth.      coenzyme Q-10 30 MG capsule Take 60 mg by mouth every day.        Multiple Vitamins-Minerals (MULTI VITAMIN/MINERALS PO) Take  by mouth.        Calcium Carbonate-Vitamin D (CALCIUM + D) 600-200 MG-UNIT TABS Take  by mouth.        atorvastatin (LIPITOR) 80 MG tablet Take 40 mg by mouth every evening.        Nirmatrelvir&Ritonavir 300/100 20 x 150 MG & 10 x 100MG Tablet Therapy Pack Take 300 mg nirmatrelvir (two 150 mg tablets) with 100 mg ritonavir (one 100 mg tablet) by mouth, with all three tablets taken together twice daily for 5 days. (Patient not taking: Reported on 1/8/2024) 30 Each 0    ezetimibe (ZETIA) 10 MG Tab Take 1 Tablet by mouth every day. (Patient not taking: Reported on 1/8/2024) 90 Tablet 3    aspirin (ASA) 325 MG TABS Take 1 Tab by mouth every bedtime. (Patient taking differently: Take 325 mg by mouth at bedtime. 4x a day) 30 Each 5     No current facility-administered medications for this encounter.       ALLERGIES:    Terramycin [oxytetracycline]    FAMILY HISTORY:    family history includes Breast Cancer in her daughter, maternal aunt, and sister.    SOCIAL HISTORY:     reports that she quit smoking about 45 years ago. Her smoking use included cigarettes. She started smoking about 58 years ago. She has a 13.0 pack-year smoking history. She has never used smokeless tobacco. She  "reports that she does not currently use alcohol. She reports that she does not use drugs. She states she lives in [place] with her spouse, Justen. She is retired from insurance work.     REVIEW OF SYSTEMS:    A complete review of systems taken. Pertinent items in HPI. All others negative.    PHYSICAL EXAM:    PERFORMANCE STATUS:      1/8/2024     1:25 PM   ECOG Performance Review   ECOG Performance Status Restricted in physically strenuous activity but ambulatory and able to carry out work of a light or sedentary nature, e.g., light house work, office work         1/8/2024     1:25 PM   Karnofsky Score   Karnofsky Score 70     /73 (BP Location: Right arm, Patient Position: Sitting, BP Cuff Size: Adult)   Pulse 70   Temp 36.2 °C (97.2 °F)   Resp 16   Ht 1.626 m (5' 4\")   Wt 59.3 kg (130 lb 11.7 oz)   SpO2 98%   BMI 22.44 kg/m²   Physical Exam  Constitutional:       Appearance: Normal appearance.   HENT:      Head: Normocephalic and atraumatic.   Pulmonary:      Effort: Pulmonary effort is normal. No respiratory distress.   Musculoskeletal:         General: Normal range of motion.   Neurological:      Mental Status: She is alert.      Cranial Nerves: Dysarthria present.      Comments: R facial droop, dysarthria noted  CN II through XII otherwise intact  RUE 3/5 throughout  LLE and LUE 5/5 throughout  Gait intact          LABORATORY DATA:   Lab Results   Component Value Date/Time    WBC 12.20 (H) 12/14/2023 03:50 AM    WBC 6.4 06/26/2023 09:02 AM    RBC 3.71 (L) 12/14/2023 03:50 AM    RBC 4.59 06/26/2023 09:02 AM    HEMOGLOBIN 11.5 (L) 12/14/2023 03:50 AM    HEMOGLOBIN 16.0 06/26/2023 09:02 AM    HEMATOCRIT 34.5 (L) 12/14/2023 03:50 AM    HEMATOCRIT 43.7 06/26/2023 09:02 AM    MCV 92.9 12/14/2023 03:50 AM    MCV 95.2 06/26/2023 09:02 AM    MCH 31.1 12/14/2023 03:50 AM    MCH 34.9 (H) 06/26/2023 09:02 AM    MCHC 33.5 12/14/2023 03:50 AM    MCHC 36.6 (H) 06/26/2023 09:02 AM    RDW 13.6 12/14/2023 03:50 AM    RDW " 47.9 06/26/2023 09:02 AM    PLATELETCT 211 12/14/2023 03:50 AM    PLATELETCT 264 06/26/2023 09:02 AM    MPV 8.8 12/14/2023 03:50 AM    MPV 10.2 06/26/2023 09:02 AM    NEUTSPOLYS 83.6 12/14/2023 03:50 AM    NEUTSPOLYS 56.80 06/26/2023 09:02 AM    LYMPHOCYTES 8.8 12/14/2023 03:50 AM    LYMPHOCYTES 29.80 06/26/2023 09:02 AM    MONOCYTES 7.5 12/14/2023 03:50 AM    MONOCYTES 9.20 06/26/2023 09:02 AM    EOSINOPHILS 0.0 12/14/2023 03:50 AM    EOSINOPHILS 3.10 06/26/2023 09:02 AM    BASOPHILS 0.1 12/14/2023 03:50 AM    BASOPHILS 0.80 06/26/2023 09:02 AM      Lab Results   Component Value Date/Time    SODIUM 142 12/14/2023 03:50 AM    SODIUM 144 06/26/2023 09:02 AM    POTASSIUM 4.6 12/14/2023 03:50 AM    POTASSIUM 4.6 06/26/2023 09:02 AM    CHLORIDE 111 (H) 12/14/2023 03:50 AM    CHLORIDE 106 06/26/2023 09:02 AM    CO2 24.0 12/14/2023 03:50 AM    CO2 27 06/26/2023 09:02 AM    GLUCOSE 143 (H) 12/14/2023 03:50 AM    GLUCOSE 107 (H) 06/26/2023 09:02 AM    BUN 14.0 12/14/2023 03:50 AM    BUN 13 06/26/2023 09:02 AM    CREATININE 0.60 12/14/2023 03:50 AM    CREATININE 0.61 06/26/2023 09:02 AM    BUNCREATRAT 23.3 (H) 12/14/2023 03:50 AM           RADIOLOGY DATA:  DX-CHEST-LIMITED (1 VIEW)    Result Date: 12/8/2023  No acute cardiopulmonary abnormality.    CT Head without Contrast    Result Date: 12/13/2023  POSTSURGICAL CHANGES OF LEFT FRONTAL CARMEN HOLE AND OPERATIVE BED TRACT ALONG THE LEFT FRONTAL REGION WITH AIR CAVITY IN THE LEFT FRONTAL REGION ALONG THE LEFT LATERAL ANTERIOR FALX CORRESPONDING TO ENHANCING NODULE IN THIS AREA PREVIOUS MRI.  THE OPERATIVE BED TRACT ALSO GOES THROUGH THE LEFT FRONTAL AREA OF GENERAL SWELLING AND INCREASED SIGNAL.  POSTSURGICAL CHANGES WITH PNEUMOCEPHALUS IN THE LEFT FRONTAL REGION ALONG THE ANTERIOR FALX AND IN THE LEFT FRONTAL SUBDURAL AREA. SECOND LEFT FRONTAL MASS IN THE LEFT FRONTAL DEEP WHITE MATTER WITH MODERATE ADJACENT EDEMA SIMILAR TO PRIOR CT. NO EVIDENCE OF HEMORRHAGE.    CT Abdomen  Pelvis with IV Contrast Only    Result Date: 12/8/2023  MILDLY ENLARGED UTERUS WITH FOCAL FIBROID CHANGES. APPENDIX NOT SEEN.  NO EVIDENCE OF ACUTE APPENDICITIS. SUSPECTED MODERATE TO SEVERE SPINAL STENOSIS AT L4-5 WITH SEVERE L4-5 FACET ARTHROPATHY, DIFFUSE MODERATE ANNULAR BULGE. OTHERWISE NEGATIVE EXAM.  NO EVIDENCE OF PRIMARY OR METASTATIC DISEASE IN THE ABDOMEN AND PELVIS.    CT Chest with Contrast    Result Date: 12/8/2023  TOTAL OF 6 SMALL PERIPHERAL PLEURAL-BASED NODULES IN THE LUNGS BILATERALLY AS DESCRIBED.  THESE ARE LIKELY BENIGN. OTHERWISE NEGATIVE EXAM.  NO EVIDENCE OF PRIMARY OR METASTATIC MALIGNANCY IN THE CHEST.    MRI Brain with and without Contrast    Result Date: 12/8/2023  LEFT FRONTAL T2 BRIGHT GYRAL MASS WITH MILD DIFFUSE ENHANCEMENT AND NO SIGNIFICANT MASS EFFECT.  THIS LIKELY REPRESENTS A PRIMARY GLIOMA. 2 ENHANCING NODULES IN THE LEFT FRONTAL AND LEFT FRONTAL PARIETAL CONVEXITY REGIONS WITH MILD TO MODERATE ADJACENT EDEMA.  THE NODULES MEASURE 11 MM AND 18 MM RESPECTIVELY.  THESE MAY REPRESENT DAUGHTER LESIONS WITH MULTIFOCAL GLIOMA OR POSSIBLY METASTASES.    CT Head Stroke without Contrast    Result Date: 12/8/2023  Suspected mass with associated edema in the left frontoparietal region.  Brain MRI without and with contrast recommended      IMPRESSION:    A 73 y.o. with likely high-grade glioma, final pathology pending, who I am seeing for discussion about adjuvant chemoradiation.      RECOMMENDATIONS:   I had a long discussion with Jasmine and her family today along with her friends who are 2 physicians about her situation.  We frankly discussed the recent workup and findings and the preliminary pathology that likely points to a high-grade glioma.  While the IDH 1 is pending, she does have a CDK N2 a mutation which is concerning, and at any rate she has either an aggressive grade 3 or grade 4 glioma.  Given multifocal disease, I certainly agree with no surgical resection further, and I  discussed options for adjuvant chemoradiation aimed at slowing down progression and for maintaining quality of life over the next few months to perhaps 1 year.  We discussed various options for fractionated radiation including conventional 6 weeks of treatment, hypofractionated treatment with 3 weeks as well as a very hypofractionated course over the course of 1 week.  We also discussed possible concurrent as well as adjuvant Temodar.  She is going to see medical oncology later this week to discuss that further.  However, taking her overall wishes to maintain her quality of life and balance treatment against toxicities, I think would be reasonable to proceed with a hypofractionated course of 3 weeks of radiation therapy.  Once we have MGMT status back, and she and Dr. Obregon can make a final decision about concurrent versus sequential chemotherapy.  For now, after thorough discussion, we will plan to proceed with a CT simulation later this week and anticipate 3 weeks of radiation therapy, either with or without Temodar to start approximately a week or so thereafter.     Given her ongoing symptoms, I will also plan to start her on steroids with dexamethasone 4 mg twice daily to see if we can improve some of the edema and the right-sided deficits that she is having.  Will plan to address this as an ongoing basis.  They have enough antiepileptics currently and they do not need these, but have advised him to let me know when they are necessary.    Thank you for the opportunity to participate in her care.  If any questions or comments, please do not hesitate in calling.  Approximately 70 minutes were spent on this visit, including extensive face-to-face visit, review of treatment options, and postvisit documentation.    Orders Placed This Encounter    REFERRAL TO ONCOLOGY NURSE NAVIGATOR    levETIRAcetam (KEPPRA) 500 MG Tab    oxyCODONE-acetaminophen (PERCOCET) 5-325 MG Tab    latanoprost (XALATAN) 0.005 % Solution

## 2024-01-08 NOTE — CT SIMULATION
PATIENT NAME Davis Roque   PRIMARY PHYSICIAN Daly Forbes [id number]   REFERRING PHYSICIAN Rena Robison M.D. 1950     No matching staging information was found for the patient.       Treatment Planning CT Simulation      Order Questions     Question Answer Comment    Is this for a new course of treatment? Yes     Is this an Addendum? No     Implanted Device/Pacemaker No     Simulation Status Initial     Planned Start Date 1/22/2024     Treatment Site Brain     Laterality Midline     Treatment Technique IMRT     Treatment Pattern/Frequency Daily     Number of fractions: 15     Concurrent Chemotherapy No     CT Technique 3D     Slice Thickness 2mm     Scan Extent Brain     Contrast IV 5 min delay    IV Contrast Volume Other     Volume (cc) 100     Treatment Device(s) S-Frame Mask     Patient Position Supine     Patient Orientation Head First     Arm Position Down     Chin Position Neutral     Treatment Machine No preference     Treatment Image Guidance CBCT     Image Guidance Match Bone     Treatment Planning Image Fusion CT/MR     Other Orders Weekly Physics Check     Release to patient Immediate

## 2024-01-08 NOTE — PROGRESS NOTES
"Patient was seen today in clinic with Dr. Preciado for consult.  Vitals signs and weight were obtained and pain assessment was completed.  Allergies and medications were reviewed with the patient.       Vitals/Pain:  Vitals:    01/08/24 1313   BP: 117/73   BP Location: Right arm   Patient Position: Sitting   BP Cuff Size: Adult   Pulse: 70   Resp: 16   Temp: 36.2 °C (97.2 °F)   SpO2: 98%   Weight: 59.3 kg (130 lb 11.7 oz)   Height: 1.626 m (5' 4\")   Pain Score: No pain        Allergies:   Terramycin [oxytetracycline]    Current Medications:  Current Outpatient Medications   Medication Sig Dispense Refill    levETIRAcetam (KEPPRA) 500 MG Tab Take 500 mg by mouth 2 times a day.      oxyCODONE-acetaminophen (PERCOCET) 5-325 MG Tab Take 1 Tablet by mouth.      latanoprost (XALATAN) 0.005 % Solution Administer 1 Drop into affected eye(s) every day.      Multiple Vitamins-Minerals (PRESERVISION AREDS PO) Take  by mouth.      coenzyme Q-10 30 MG capsule Take 60 mg by mouth every day.        Multiple Vitamins-Minerals (MULTI VITAMIN/MINERALS PO) Take  by mouth.        Calcium Carbonate-Vitamin D (CALCIUM + D) 600-200 MG-UNIT TABS Take  by mouth.        atorvastatin (LIPITOR) 80 MG tablet Take 40 mg by mouth every evening.        Nirmatrelvir&Ritonavir 300/100 20 x 150 MG & 10 x 100MG Tablet Therapy Pack Take 300 mg nirmatrelvir (two 150 mg tablets) with 100 mg ritonavir (one 100 mg tablet) by mouth, with all three tablets taken together twice daily for 5 days. (Patient not taking: Reported on 1/8/2024) 30 Each 0    ezetimibe (ZETIA) 10 MG Tab Take 1 Tablet by mouth every day. (Patient not taking: Reported on 1/8/2024) 90 Tablet 3    aspirin (ASA) 325 MG TABS Take 1 Tab by mouth every bedtime. (Patient taking differently: Take 325 mg by mouth at bedtime. 4x a day) 30 Each 5     No current facility-administered medications for this encounter.           Dorcas Doyle R.N.  "

## 2024-01-11 NOTE — PROGRESS NOTES
Consult Note: Hematology/Oncology     Referring Provider Dr. Deidra Preciado  Primary Care:  Sabas Vo M.D.    Chief Complaint   Patient presents with    New Patient     Malignant neoplasm of central nervous system       Current Treatment: None    Prior Treatment: None    Subjective:   History of Presenting Illness:  Davis Roque is a 73 y.o. female who presents today with a new diagnosis of glioma.    Her history dates back to November when she first presented for facial droop.  She also had slurred speech at that time.  She went to an urgent care where they did a CT of her head which revealed a mass.  This mass was associated with edema left frontal parietal region.  She had a CT chest abdomen pelvis which was negative for metastatic disease.  He had a brain MRI which revealed a left frontal gyral mass concerning for primary glioma with 2 enhancing satellite nodules and 1 in the left frontal and then 1 in the left frontoparietal region.  These measured approximately 11 and 18 mm.  She had a biopsy done which confirmed a glioma and was found to be CDKN2A positive IDH and MGMT Was negative.    She is since seen Dr. Preciado and the plan is to start with hypofractionated radiation.  She is here to discuss chemotherapy within the concurrent in the adjuvant setting    Today she presents with her 2 friends for physicians, 1 is a pain management specialist the other 1 is a urologist.  Her  very also with attended the visit today    Past Medical History:   Diagnosis Date    Glaucoma     Hyperlipemia         Past Surgical History:   Procedure Laterality Date    OTHER  12/13/2023    L frontal ayush hole needle bx    APPENDECTOMY      1st grade    TONSILLECTOMY      1st grade       Social History     Tobacco Use    Smoking status: Former     Current packs/day: 0.00     Average packs/day: 1 pack/day for 13.0 years (13.0 ttl pk-yrs)     Types: Cigarettes     Start date: 1/1/1966     Quit date: 1/1/1979     Years  since quittin.0    Smokeless tobacco: Never   Vaping Use    Vaping Use: Never used   Substance Use Topics    Alcohol use: Not Currently     Comment: Daily glass of wine a night    Drug use: Never        Family History   Problem Relation Age of Onset    Breast Cancer Sister     Breast Cancer Maternal Aunt     Breast Cancer Daughter        Allergies   Allergen Reactions    Terramycin [Oxytetracycline] Hives     Nausea/hives       Current Outpatient Medications   Medication Sig Dispense Refill    levETIRAcetam (KEPPRA) 500 MG Tab Take 500 mg by mouth 2 times a day.      latanoprost (XALATAN) 0.005 % Solution Administer 1 Drop into affected eye(s) every day.      dexamethasone (DECADRON) 4 MG Tab Take 1 Tablet by mouth 2 times a day for 30 days. 60 Tablet 4    oxyCODONE immediate release (ROXICODONE) 10 MG immediate release tablet Take 1 Tablet by mouth every 6 hours as needed (Pain) for up to 10 days. 40 Tablet 0    Multiple Vitamins-Minerals (PRESERVISION AREDS PO) Take  by mouth.      coenzyme Q-10 30 MG capsule Take 60 mg by mouth every day.        Multiple Vitamins-Minerals (MULTI VITAMIN/MINERALS PO) Take  by mouth.        Calcium Carbonate-Vitamin D (CALCIUM + D) 600-200 MG-UNIT TABS Take  by mouth.        atorvastatin (LIPITOR) 80 MG tablet Take 40 mg by mouth every evening.        ezetimibe (ZETIA) 10 MG Tab Take 1 Tablet by mouth every day. (Patient not taking: Reported on 2024) 90 Tablet 3    aspirin (ASA) 325 MG TABS Take 1 Tab by mouth every bedtime. (Patient not taking: Reported on 2024) 30 Each 5     No current facility-administered medications for this encounter.       Review of Systems   Constitutional:  Positive for malaise/fatigue. Negative for chills, fever and weight loss.   HENT:  Negative for congestion, ear pain, nosebleeds and sore throat.    Eyes:  Negative for blurred vision.   Respiratory:  Negative for cough, sputum production, shortness of breath and wheezing.   "  Cardiovascular:  Negative for chest pain, palpitations, orthopnea and leg swelling.   Gastrointestinal:  Negative for abdominal pain, heartburn, nausea and vomiting.   Genitourinary:  Negative for dysuria, frequency and urgency.   Musculoskeletal:  Negative for neck pain.   Neurological:  Positive for speech change, focal weakness and weakness. Negative for dizziness, tingling, tremors, sensory change and headaches.   Endo/Heme/Allergies:  Does not bruise/bleed easily.   Psychiatric/Behavioral:  Negative for depression, memory loss and suicidal ideas. The patient is nervous/anxious and has insomnia.    All other systems reviewed and are negative.      Problem list, medications, and allergies reviewed by myself today in Epic.     Objective:     Vitals:    01/11/24 1102   BP: 112/64   BP Location: Right arm   Patient Position: Sitting   BP Cuff Size: Adult   Pulse: 66   Temp: 37 °C (98.6 °F)   TempSrc: Temporal   SpO2: 98%   Weight: 59.8 kg (131 lb 11.6 oz)   Height: 1.626 m (5' 4.02\")       DESC; KARNOFSKY SCALE WITH ECOG EQUIVALENT: 60, Requires occasional assistance, but is able to care for most of his personal needs (ECOG equivalent 2)    DISTRESS LEVEL: no acute distress    Physical Exam  Constitutional:       General: She is not in acute distress.     Appearance: Normal appearance. She is not ill-appearing.   HENT:      Head: Normocephalic and atraumatic.      Nose: Nose normal.      Mouth/Throat:      Mouth: Mucous membranes are moist.      Pharynx: No oropharyngeal exudate or posterior oropharyngeal erythema.   Eyes:      General: No scleral icterus.     Conjunctiva/sclera: Conjunctivae normal.      Pupils: Pupils are equal, round, and reactive to light.   Cardiovascular:      Rate and Rhythm: Normal rate and regular rhythm.      Pulses: Normal pulses.      Heart sounds: Normal heart sounds. No murmur heard.     No friction rub. No gallop.   Pulmonary:      Effort: Pulmonary effort is normal. No respiratory " distress.      Breath sounds: Normal breath sounds. No stridor. No wheezing, rhonchi or rales.   Chest:      Chest wall: No tenderness.   Abdominal:      General: Abdomen is flat. Bowel sounds are normal. There is no distension.      Palpations: Abdomen is soft. There is no mass.      Tenderness: There is no abdominal tenderness. There is no guarding.   Musculoskeletal:         General: No swelling, tenderness or deformity. Normal range of motion.      Cervical back: Normal range of motion and neck supple. No rigidity or tenderness.      Right lower leg: No edema.      Left lower leg: No edema.   Skin:     General: Skin is warm and dry.      Coloration: Skin is not jaundiced or pale.      Findings: No bruising, erythema or rash.   Neurological:      Mental Status: She is alert and oriented to person, place, and time.      Sensory: No sensory deficit.      Motor: Weakness present.      Coordination: Coordination normal.      Gait: Gait abnormal.      Comments: Decreased strength in her right arm.  Slight facial droop.  Asymmetrical smile   Psychiatric:         Mood and Affect: Mood normal.         Behavior: Behavior normal.         Thought Content: Thought content normal.         Judgment: Judgment normal.         Labs:   Most recent labs reviewed.  Leukocytosis    Imaging:   Please see H&P    Pathology:  Per Dr. Preciado: IDH1 wt and MGMT unmethylated    Assessment/Plan:      Cancer Staging   Glioma of brain (HCC)  Staging form: Brain and Spinal Cord, AJCC Version 9  - Clinical stage from 1/11/2024: WHO G4 - Signed by Deidra Preciado M.D. on 1/11/2024       Ms. Roque is a 78-year-old female who presents today with a high-grade glioma.    Today we reviewed his most recent imaging and pathology.  I do not have the physical hard copy of her results however I was told from Dr. Preciado that IDH 1 was wild-type and MGMT was unmethylated.  I will be requesting those records.  We then moved on to discuss treatment.  We  discussed that standard treatment typically involves resection however according to the multidisciplinary tumor board discussion he is not a good surgical candidate and would benefit from proceeding with radiation +/- chemotherapy.    We discussed that chemotherapys benefit relies on MGMT methylation.     I explained what MGMT is.  MGMT is in DNA repair enzyme that reverses the DNA damage caused by alkylating agents resulting in tumor resistance to temozolomide.  Methylation of the MGMT promoter silence is MGMT and makes the tumor more sensitive to alkylating agents    We discussed the prognostic value of MGMT promoter methylation.  I informed them that MGMT motor methylation is strongly associated with IDH mutations and confers a survival advantage and glioblastoma.  It is particularly useful to treatment decisions for older patients with high-grade glioma as and that patients with glioblastoma that do not have MGMT promoter methylation derived less benefit from treatment with temozolomide compared to those that are methylated.    Her MGMT status is unmethylated (although I will obtain records to confirm) however there was still a benefit of adding temozolomide to radiation in MGMT promoter unmethylated tumors although this number did not reach statistical significance (10.0 months vs. 7.9 months, respectively).    We did discuss that it is well-established that temozolomide has better efficacy in patients with MGMT promoter methylation it really remains a debate whether temozolomide to be admitted in patients treated glioblastoma with unmethylated MGMT.    We did discuss that overall independent of age and median overall survival for patients with unmethylated GBM treated with radiation and temozolomide pooled from 5 phase 3 studies was 14.11 months with a median progression free survival of roughly 5 months.     We discussed the side effects associated with this temozolomide which typically are acute and delayed  nausea and vomiting, fatigue    Patient wishes to proceed with hypofractionated radiation without temozolomide.      Plan   to proceed with hypofractionated radiation  Will need to get this final lab results scanned into the media  Will see patient in 1 month after hypofractionated radiation to discuss adjuvant treatment  Referral to Dr. Ordonez  Referral to palliative care for symptom management  Referral to nurse navigation  Referral to  as requested by patient    Any questions and concerns raised by the patient were addressed and answered. Patient denies any further questions.  Patient encouraged to call the office with any concerns or issues.     Silva Obregon M.D.  Hematology/Oncology    62 minutes was spent on this case

## 2024-01-11 NOTE — RADIATION PLANNING NOTES
DATE OF SERVICE: 1/11/2024    DIAGNOSIS:  Glioma of brain (HCC)  Staging form: Brain and Spinal Cord, AJCC Version 9  - Clinical stage from 1/11/2024: WHO G4 - Signed by Deidra Preciado M.D. on 1/11/2024  Histopathologic type: Glioblastoma  Stage prefix: Initial diagnosis  Histologic grading system: 4 grade system  CDKN2A/B homozygous deletion: Positive  MGMT methylation: Absent       DATE OF SERVICE: 1/11/2024    TYPE OF SIMULATION: Brain    GOAL OF TREATMENT:  [] Curative  [x] Palliative  [] Oligometastatic    CONTRAST:    [x] IV Contrast  [] Oral Contrast               POSITION:    [x]  Supine  [] Prone     COMPLEX:  [x] Complex Blocking   []Arcs  [] Custom Blocks  [] >3 Sites    PROCEDURE: Patient Underwent CT.  Patient head was immobilized with a thermoplastic face mask.  films evaluated to ensure proper patient position.  CT obtained vertex to mid-cervical spine. Exam pushed to treatment planning system.    I have personally reviewed the relevant data, performed the target localization, and determined all relevant factors for this patient’s simulation.

## 2024-01-11 NOTE — RADIATION PLANNING NOTES
Clinical Treatment Planning Note    DATE OF SERVICE: 1/11/2024    DIAGNOSIS: Glioblastoma      IMAGING REVIEWED:  [x] CT     [] MRI     [] PET/CT     [] BONE SCAN     [] MAMMO     [] OTHER      TREATMENT INTENT:   [] CURATIVE     [] MAINTENANCE     []  PALLIATIVE      []  SUPPORTIVE     []  PROPHYLACTIC     [] BENIGN     []  CONSOLIDATIVE      [x] DEFINITIVE   []  OLOGIMETASTATIC      LINE OF TREATMENT:  [] ADJUVANT   [x] DEFINITIVE   [] NEOADJUVANT   [] RE-TREATMENT      TECHNIQUE PLANNED:  [x] IMRT   [] 3D   [] SBRT   [] SRS/SRT   [] HDR   [] ELECTRON       IMRT JUSTIFICATION:  []   An immediately adjacent area has been previously irradiated and abutting portals must be established with high precision.    []  Dose escalation is planned to deliver radiation doses in excess of those commonly utilized for similar tumors with conventional treatment.    [x]  The target volume is concave or convex, and the critical normal tissues are within or around that convexity or concavity.    [x]  The target volume is in close proximity to critical structures that must be protected.    [x]  The volume of interest must be covered with narrow margins to adequately protect  immediately adjacent structures.      FIELDS & BLOCKING:  [x] COMPLEX BLOCKS     []  = 3 TX AREAS     []  ARCS     []  CUSTOM SHEILD        []  SIMPLE BLOCK      CHEMOTHERAPY:  []  CONCURRENT     []  INDUCTION     [x] SEQUENTIAL     []  <30 DAYS FROM XRT      NOTES:  OAR CONSTRAINTS: (GUIDELINES ONLY NOT ABSOLUTE)    Target Prescribed Coverage   PTV2 95% of PTV2 covered by 100% (Gy) of RX Dose     PTV1 95% of PTV1 covered by 100% (Gy) of RX Dose     PTV2 PTV2 minimum dose > 90% (Gy) of RX Dose     PTV1 PTV1 minimum dose > 90% (Gy) of RX Dose       RENEE Goal   Brainstem Max Dose < 54Gy   Brainstem + 0.2cm Max Dose < 54Gy   Brainstem + 3mm Max Dose < 54Gy   Optic Chiasm Max Dose < 50Gy   Optic Chiasm + 3mm Max Dose < 50Gy   R Optic Nerve Max Dose < 50Gy   R Optic  Nerve + 3mm Max Dose < 50Gy   L Optic Nerve Max Dose < 50Gy   L Optic Nerve + 3mm Max Dose < 50Gy   R Eye Max Dose < 35Gy   L Eye Max Dose < 35Gy   R Lens Max Dose < 5-7Gy   L Lens Max Dose < 5-7Gy   Cord Max Dose < 50Gy   *RTOG 0913

## 2024-01-19 NOTE — PROGRESS NOTES
Call placed to patient for navigation and to follow up on DST of 3/10.  Spoke with patient.  She reported has decided to not do treatment.  Pt slow with speech, but saying she wanted to thank Dr Preciado, but does not want to do anything further.  She said she was in the process of getting hospice on board.  She will not be coming to appointment today.    Spoke with Dr Preciado.  He reported appointment was a phone call follow up and will discuss with patient and her daughter.

## 2024-01-19 NOTE — PROGRESS NOTES
This visit is being conducted by telephone. This telephone visit was initiated by the patient and they verbally consented.  [facility].      Reason for Call:  Tx planning    Treatment History:     Radiation Oncology           No data to display                 HPI:    Subjective    I am seeing Ms. Roque for a recently diagnosed glioma for consideration of adjuvant treatment.  Her history dates back to about November or December this year when she presented with a right facial droop and slurred speech over the course of the week.  This led to a visit to the urgent care where CT of the head revealed a mass with associated edema in the left frontoparietal region.  A chest abdomen pelvis was done that was negative for any masses or metastatic disease.  This was followed with a an MRI brain that revealed a left frontal gyral mass concerning for primary glioma with 2 enhancing satellite nodules and 1 in the left frontal and then another in the left frontoparietal convexity regions measuring about 11 and 18 mm respectively.  She had a bur hole biopsy done that confirmed a glioma with increased cellularity and microvascular proliferation, CDK N2 a is positive, but IDH and MGMT unfortunately were not done on the molecular panel.  These have been requested from neuro tumor board earlier this morning.     She saw Dr. Robison for postoperative follow-up and had some mild improvement in headaches, though complained of cough and difficulty swallowing as well as right lower facial drooping and drooling.  She was discussed in our neuro-oncology tumor board and now presents for consideration of adjuvant chemoradiation.        Interval History:   1/19/24 We are having a phone follow up to determine what her preference is, as they had called requesting hospice and stopping treatments. She has declined somewhat according to her daughter and doesn't want to do anything more.    Labs / Images Reviewed:    DX-CHEST-LIMITED (1 VIEW)    Result Date: 12/8/2023  No acute cardiopulmonary abnormality.    X-ray Abdomen 3 Views    Result Date: 1/14/2024  No acute abnormality identified.    CT Head without Contrast    Result Date: 12/13/2023  POSTSURGICAL CHANGES OF LEFT FRONTAL CARMEN HOLE AND OPERATIVE BED TRACT ALONG THE LEFT FRONTAL REGION WITH AIR CAVITY IN THE LEFT FRONTAL REGION ALONG THE LEFT LATERAL ANTERIOR FALX CORRESPONDING TO ENHANCING NODULE IN THIS AREA PREVIOUS MRI.  THE OPERATIVE BED TRACT ALSO GOES THROUGH THE LEFT FRONTAL AREA OF GENERAL SWELLING AND INCREASED SIGNAL.  POSTSURGICAL CHANGES WITH PNEUMOCEPHALUS IN THE LEFT FRONTAL REGION ALONG THE ANTERIOR FALX AND IN THE LEFT FRONTAL SUBDURAL AREA. SECOND LEFT FRONTAL MASS IN THE LEFT FRONTAL DEEP WHITE MATTER WITH MODERATE ADJACENT EDEMA SIMILAR TO PRIOR CT. NO EVIDENCE OF HEMORRHAGE.    CT Abdomen Pelvis with IV Contrast Only    Result Date: 12/8/2023  MILDLY ENLARGED UTERUS WITH FOCAL FIBROID CHANGES. APPENDIX NOT SEEN.  NO EVIDENCE OF ACUTE APPENDICITIS. SUSPECTED MODERATE TO SEVERE SPINAL STENOSIS AT L4-5 WITH SEVERE L4-5 FACET ARTHROPATHY, DIFFUSE MODERATE ANNULAR BULGE. OTHERWISE NEGATIVE EXAM.  NO EVIDENCE OF PRIMARY OR METASTATIC DISEASE IN THE ABDOMEN AND PELVIS.    CT Chest with Contrast    Result Date: 12/8/2023  TOTAL OF 6 SMALL PERIPHERAL PLEURAL-BASED NODULES IN THE LUNGS BILATERALLY AS DESCRIBED.  THESE ARE LIKELY BENIGN. OTHERWISE NEGATIVE EXAM.  NO EVIDENCE OF PRIMARY OR METASTATIC MALIGNANCY IN THE CHEST.    MRI Brain with and without Contrast    Result Date: 12/8/2023  LEFT FRONTAL T2 BRIGHT GYRAL MASS WITH MILD DIFFUSE ENHANCEMENT AND NO SIGNIFICANT MASS EFFECT.  THIS LIKELY REPRESENTS A PRIMARY GLIOMA. 2 ENHANCING NODULES IN THE LEFT FRONTAL AND LEFT FRONTAL PARIETAL CONVEXITY REGIONS WITH MILD TO MODERATE ADJACENT EDEMA.  THE NODULES MEASURE 11 MM AND 18 MM RESPECTIVELY.  THESE MAY REPRESENT DAUGHTER LESIONS WITH MULTIFOCAL GLIOMA OR  POSSIBLY METASTASES.    CT Head Stroke without Contrast    Result Date: 12/8/2023  Suspected mass with associated edema in the left frontoparietal region.  Brain MRI without and with contrast recommended      Assessment:   Glioma of brain (HCC)  Staging form: Brain and Spinal Cord, AJCC Version 9  - Clinical stage from 1/11/2024: WHO G4 - Signed by Deidra Preciado M.D. on 1/11/2024  Histopathologic type: Glioblastoma  Stage prefix: Initial diagnosis  Histologic grading system: 4 grade system  CDKN2A/B homozygous deletion: Positive  MGMT methylation: Absent      Cancer Status:   Pending Start of Therapy    Plan:   After discussing it with her daughter and the patient herself, the patient clearly does not want any further treatment.  I think given the relatively aggressive nature of her tumor and limited life expectancy, we have appropriate to refer her to hospice.  They requested referral to [facility] hospice, I have placed this order.    Time Spent on Call: 5 min      Time Spent in Preparation: 5 min    Total Time Spent: 10 min    Deidra Preciado M.D.

## 2024-02-23 ENCOUNTER — APPOINTMENT (OUTPATIENT)
Dept: HEMATOLOGY ONCOLOGY | Facility: MEDICAL CENTER | Age: 74
End: 2024-02-23
Payer: MEDICARE